# Patient Record
Sex: MALE | Race: WHITE
[De-identification: names, ages, dates, MRNs, and addresses within clinical notes are randomized per-mention and may not be internally consistent; named-entity substitution may affect disease eponyms.]

---

## 2018-05-12 NOTE — EDM.PDOC
ED HPI GENERAL MEDICAL PROBLEM





- General


Chief Complaint: Back Pain or Injury


Stated Complaint: back pain,sob


Time Seen by Provider: 05/12/18 12:12


Source of Information: Reports: Patient


History Limitations: Reports: No Limitations





- History of Present Illness


INITIAL COMMENTS - FREE TEXT/NARRATIVE: 


History of present illness:


[]Patient started feeling right-sided mid back pain this morning that feels 

like a severe muscle spasm. Patient says it makes it hard for him to breathe 

although he is not short of breath. Patient denies any fevers, chills. 





Review of systems: 


As per history of present illness and below otherwise all systems reviewed and 

negative.





Past medical history: 


As per history of present illness and as reviewed below otherwise 

noncontributory.





Surgical history: 


As per history of present illness and as reviewed below otherwise 

noncontributory.





Social history: 


No reported history of drug or alcohol abuse.





Family history: 


As per history of present illness and as reviewed below otherwise 

noncontributory.





Physical exam:


General: Well developed, well nourished in NAD


HEENT: Atraumatic, normocephalic, pupils reactive, negative for conjunctival 

pallor or scleral icterus, mucous membranes moist, throat clear, neck supple, 

nontender, trachea midline.


Lungs: Clear to auscultation, breath sounds equal bilaterally, chest nontender.


Heart: S1S2, regular, negative for clicks, rubs, or JVD.


Abdomen: Soft, nondistended, nontender. Negative for masses or 

hepatosplenomegaly. Negative for costovertebral tenderness. Right back shows 

linear area of erythematous rash without using


Pelvis: Stable nontender.


Genitourinary: Deferred.


Rectal: Deferred.


Extremities: Atraumatic, negative for cords or calf pain. Neurovascular 

unremarkable.


Neuro: Awake, alert, oriented. Cranial nerves II through XII unremarkable. 

Cerebellum unremarkable. Motor and sensory unremarkable throughout. Exam 

nonfocal.





Diagnostics:


[]Chest x-ray shows mild effusion of left





Therapeutics:


[]Dilaudid, Zofran given in the ED





Impression: 


[]Herpes zoster





Plan:


[]Acyclovir, tramadol for pain (primary care





Definitive disposition and diagnosis as appropriate pending reevaluation and 

review of above.





  ** Right Middle Back


Pain Score (Numeric/FACES): 7





- Related Data


 Allergies











Allergy/AdvReac Type Severity Reaction Status Date / Time


 


No Known Allergies Allergy   Verified 05/12/18 12:28











Home Meds: 


 Home Meds





Lisinopril/Hydrochlorothiazide [Lisinopril-Hctz 20-25 mg Tab] 1 tab PO DAILY 03/ 14/16 [History]


Acyclovir 800 mg PO 5XDAY #50 tablet 05/12/18 [Rx]


traMADol HCl [Tramadol HCl] 50 mg PO Q6H PRN #16 tablet 05/12/18 [Rx]











Past Medical History


HEENT History: Reports: None


Cardiovascular History: Reports: Hypertension


Respiratory History: Reports: None


Gastrointestinal History: Reports: None


Genitourinary History: Reports: None


Neurological History: Reports: None


Psychiatric History: Reports: None


Endocrine/Metabolic History: Reports: Obesity/BMI 30+


Hematologic History: Reports: None


Immunologic History: Reports: None


Oncologic (Cancer) History: Reports: None


Dermatologic History: Reports: None





- Infectious Disease History


Infectious Disease History: Reports: Chicken Pox, Mumps





- Past Surgical History


Musculoskeletal Surgical History: Reports: Shoulder Surgery





Social & Family History





- Family History


Family Medical History: Noncontributory





ED ROS GENERAL





- Review of Systems


Review Of Systems: See Below (See history of present illness)





ED EXAM, SKIN/RASH


Exam: See Below (See history of present illness)





Course





- Vital Signs


Last Recorded V/S: 


 Last Vital Signs











Temp  98.6 F   05/12/18 12:22


 


Pulse  88   05/12/18 12:22


 


Resp  18   05/12/18 12:22


 


BP  129/83   05/12/18 12:22


 


Pulse Ox  96   05/12/18 12:22














- Orders/Labs/Meds


Orders: 


 Active Orders 24 hr











 Category Date Time Status


 


 Chest 1V Frontal [CR] Stat Exams  05/12/18 12:42 Taken











Meds: 


Medications














Discontinued Medications














Generic Name Dose Route Start Last Admin





  Trade Name Freq  PRN Reason Stop Dose Admin


 


Hydromorphone HCl  1 mg  05/12/18 12:25  05/12/18 12:32





  Dilaudid  IM  05/12/18 12:26  1 mg





  ONETIME ONE   Administration





     





     





     





     


 


Ondansetron HCl  4 mg  05/12/18 12:25  05/12/18 12:33





  Zofran Odt  PO  05/12/18 12:26  4 mg





  ONETIME ONE   Administration





     





     





     





     














Departure





- Departure


Time of Disposition: 13:04


Disposition: Home, Self-Care 01


Condition: Good


Clinical Impression: 


Herpes zoster


Qualifiers:


 Herpes zoster complications: without complications Qualified Code(s): B02.9 - 

Zoster without complications








- Discharge Information


Prescriptions: 


Acyclovir 800 mg PO 5XDAY #50 tablet


traMADol HCl [Tramadol HCl] 50 mg PO Q6H PRN #16 tablet


 PRN Reason: Pain


Referrals: 


PCP,Unknown [Primary Care Provider] - 


Forms:  ED Department Discharge


Additional Instructions: 


The following information is given to patients seen in the emergency department 

who are being discharged to home. This information is to outline your options 

for follow-up care. We provide all patients seen in our emergency department 

with a follow-up referral.





The need for follow-up, as well as the timing and circumstances, are variable 

depending upon the specifics of your emergency department visit.





If you don't have a primary care physician on staff, we will provide you with a 

referral. We always advise you to contact your personal physician following an 

emergency department visit to inform them of the circumstance of the visit and 

for follow-up with them and/or the need for any referrals to a consulting 

specialist.





The emergency department will also refer you to a specialist when appropriate. 

This referral assures that you have the opportunity for follow-up care with a 

specialist. All of these measure are taken in an effort to provide you with 

optimal care, which includes your follow-up.





Under all circumstances we always encourage you to contact your private 

physician who remains a resource for coordinating your care. When calling for 

follow-up care, please make the office aware that this follow-up is from your 

recent emergency room visit. If for any reason you are refused follow-up, 

please contact the [facility] Emergency 

Department at [phone] and asked to speak to the emergency department 

charge nurse.





Acyclovir and tramadol for pain follow-up with PMD this week





[facility]


Primary Care


[address] [place] [id number]


Phone: [phone]


Fax: [phone]





- My Orders


Last 24 Hours: 


My Active Orders





05/12/18 12:42


Chest 1V Frontal [CR] Stat 














- Assessment/Plan


Last 24 Hours: 


My Active Orders





05/12/18 12:42


Chest 1V Frontal [CR] Stat

## 2018-05-14 NOTE — CR
EXAM DATE: 18



PATIENT'S AGE: 57



Patient: JESSICA LEON



Facility: [place], [place]

Patient ID: [id number]

Site Patient ID: [id number].

Site Accession #: [id number]

: 1960

Study: XRay Chest [id number]/12/2018 1:09:56 PM

Ordering Physician: Tomas Mathew



Final Report: 

INDICATION:

Pain. Short of breath.



TECHNIQUE:

Portable upright AP chest.



COMPARISON:

None. 



FINDINGS:

Heart size is within normal limits. No appreciable mediastinal or hilar mass. 
Normal pulmonary vasculature. There are right basilar airspace opacities with a 
trace right pleural effusion. Blunting of the left costophrenic sulcus suggests 
a trace pleural effusion. The left lung appears clear. No pneumothorax.



IMPRESSION:

Trace pleural effusions, right greater than left, with right basilar changes 
which could be related to atelectasis or pneumonitis. Clinically correlate.



Dictated by Efrem Figueroa MD @ 2018 2:05:12 PM



Dictated by: Efrem Figueroa MD @ 2018 14:05:18

(Electronic Signature)





Report Signed by Proxy.
RUPERT

## 2022-07-09 ENCOUNTER — HOSPITAL ENCOUNTER (EMERGENCY)
Dept: HOSPITAL 56 - MW.ED | Age: 62
Discharge: HOME | End: 2022-07-09
Payer: COMMERCIAL

## 2022-07-09 VITALS — SYSTOLIC BLOOD PRESSURE: 120 MMHG | DIASTOLIC BLOOD PRESSURE: 70 MMHG | HEART RATE: 70 BPM

## 2022-07-09 DIAGNOSIS — A69.20: Primary | ICD-10-CM

## 2022-07-09 DIAGNOSIS — Z79.899: ICD-10-CM

## 2022-07-09 DIAGNOSIS — E66.9: ICD-10-CM

## 2022-07-09 DIAGNOSIS — Z91.030: ICD-10-CM

## 2022-07-09 DIAGNOSIS — I10: ICD-10-CM

## 2022-07-09 LAB
BUN SERPL-MCNC: 17 MG/DL (ref 7–18)
CHLORIDE SERPL-SCNC: 101 MMOL/L (ref 98–107)
CO2 SERPL-SCNC: 27.9 MMOL/L (ref 21–32)
EGFRCR SERPLBLD CKD-EPI 2021: 77 ML/MIN (ref 60–?)
GLUCOSE SERPL-MCNC: 104 MG/DL (ref 74–106)
POTASSIUM SERPL-SCNC: 3.8 MMOL/L (ref 3.5–5.1)
SODIUM SERPL-SCNC: 136 MMOL/L (ref 136–148)